# Patient Record
Sex: MALE | Race: WHITE | NOT HISPANIC OR LATINO | URBAN - METROPOLITAN AREA
[De-identification: names, ages, dates, MRNs, and addresses within clinical notes are randomized per-mention and may not be internally consistent; named-entity substitution may affect disease eponyms.]

---

## 2018-05-08 ENCOUNTER — APPOINTMENT (RX ONLY)
Dept: URBAN - METROPOLITAN AREA CLINIC 52 | Facility: CLINIC | Age: 83
Setting detail: DERMATOLOGY
End: 2018-05-08

## 2018-05-08 ENCOUNTER — APPOINTMENT (RX ONLY)
Dept: URBAN - METROPOLITAN AREA CLINIC 51 | Facility: CLINIC | Age: 83
Setting detail: DERMATOLOGY
End: 2018-05-08

## 2018-05-08 DIAGNOSIS — L57.0 ACTINIC KERATOSIS: ICD-10-CM

## 2018-05-08 DIAGNOSIS — L57.8 OTHER SKIN CHANGES DUE TO CHRONIC EXPOSURE TO NONIONIZING RADIATION: ICD-10-CM

## 2018-05-08 PROBLEM — M12.9 ARTHROPATHY, UNSPECIFIED: Status: ACTIVE | Noted: 2018-05-08

## 2018-05-08 PROBLEM — J44.9 CHRONIC OBSTRUCTIVE PULMONARY DISEASE, UNSPECIFIED: Status: ACTIVE | Noted: 2018-05-08

## 2018-05-08 PROBLEM — Z85.46 PERSONAL HISTORY OF MALIGNANT NEOPLASM OF PROSTATE: Status: ACTIVE | Noted: 2018-05-08

## 2018-05-08 PROBLEM — H91.90 UNSPECIFIED HEARING LOSS, UNSPECIFIED EAR: Status: ACTIVE | Noted: 2018-05-08

## 2018-05-08 PROBLEM — J45.909 UNSPECIFIED ASTHMA, UNCOMPLICATED: Status: ACTIVE | Noted: 2018-05-08

## 2018-05-08 PROCEDURE — 17000 DESTRUCT PREMALG LESION: CPT

## 2018-05-08 PROCEDURE — 17003 DESTRUCT PREMALG LES 2-14: CPT

## 2018-05-08 PROCEDURE — 99213 OFFICE O/P EST LOW 20 MIN: CPT | Mod: 25

## 2018-05-08 PROCEDURE — ? COUNSELING

## 2018-05-08 PROCEDURE — ? LIQUID NITROGEN

## 2018-05-08 ASSESSMENT — LOCATION DETAILED DESCRIPTION DERM
LOCATION DETAILED: RIGHT SUPERIOR UPPER BACK
LOCATION DETAILED: LEFT MID PREAURICULAR CHEEK
LOCATION DETAILED: RIGHT SUPERIOR MEDIAL UPPER BACK
LOCATION DETAILED: RIGHT SUPERIOR MEDIAL UPPER BACK
LOCATION DETAILED: STERNUM
LOCATION DETAILED: LEFT MID PREAURICULAR CHEEK
LOCATION DETAILED: LEFT SUPERIOR CENTRAL MALAR CHEEK
LOCATION DETAILED: RIGHT SUPERIOR UPPER BACK
LOCATION DETAILED: LEFT SUPERIOR CENTRAL MALAR CHEEK
LOCATION DETAILED: STERNUM

## 2018-05-08 ASSESSMENT — LOCATION SIMPLE DESCRIPTION DERM
LOCATION SIMPLE: LEFT CHEEK
LOCATION SIMPLE: RIGHT UPPER BACK
LOCATION SIMPLE: LEFT CHEEK
LOCATION SIMPLE: CHEST
LOCATION SIMPLE: CHEST
LOCATION SIMPLE: RIGHT UPPER BACK

## 2018-05-08 ASSESSMENT — LOCATION ZONE DERM
LOCATION ZONE: TRUNK
LOCATION ZONE: TRUNK
LOCATION ZONE: FACE
LOCATION ZONE: FACE

## 2018-05-08 NOTE — PROCEDURE: LIQUID NITROGEN
Post-Care Instructions: I reviewed with the patient in detail post-care instructions. Patient is to wear sunprotection, and avoid picking at any of the treated lesions. Pt may apply Vaseline to crusted or scabbing areas.
Duration Of Freeze Thaw-Cycle (Seconds): 0
Render Post-Care Instructions In Note?: no
Number Of Freeze-Thaw Cycles: 3 freeze-thaw cycles
Consent: The patient's consent was obtained including but not limited to risks of crusting, scabbing, blistering, scarring, darker or lighter pigmentary change, recurrence, incomplete removal and infection.
Detail Level: Detailed

## 2018-05-08 NOTE — PROCEDURE: LIQUID NITROGEN
Duration Of Freeze Thaw-Cycle (Seconds): 0
Number Of Freeze-Thaw Cycles: 3 freeze-thaw cycles
Detail Level: Detailed
Render Post-Care Instructions In Note?: no
Consent: The patient's consent was obtained including but not limited to risks of crusting, scabbing, blistering, scarring, darker or lighter pigmentary change, recurrence, incomplete removal and infection.
Post-Care Instructions: I reviewed with the patient in detail post-care instructions. Patient is to wear sunprotection, and avoid picking at any of the treated lesions. Pt may apply Vaseline to crusted or scabbing areas.

## 2020-07-01 ENCOUNTER — TELEPHONE ENCOUNTER (OUTPATIENT)
Dept: URBAN - METROPOLITAN AREA CLINIC 118 | Facility: CLINIC | Age: 85
End: 2020-07-01

## 2020-07-02 ENCOUNTER — TELEPHONE ENCOUNTER (OUTPATIENT)
Dept: URBAN - METROPOLITAN AREA CLINIC 92 | Facility: CLINIC | Age: 85
End: 2020-07-02

## 2020-08-12 ENCOUNTER — OFFICE VISIT (OUTPATIENT)
Dept: URBAN - METROPOLITAN AREA CLINIC 118 | Facility: CLINIC | Age: 85
End: 2020-08-12

## 2020-08-12 RX ORDER — ASPIRIN 81 MG/1
TABLET, COATED ORAL
Qty: 0 | Refills: 0 | Status: ACTIVE | COMMUNITY
Start: 1900-01-01 | End: 1900-01-01

## 2020-08-12 RX ORDER — TEMAZEPAM 15 MG/1
CAPSULE ORAL
Qty: 0 | Refills: 0 | Status: ACTIVE | COMMUNITY
Start: 1900-01-01 | End: 1900-01-01

## 2020-08-12 RX ORDER — METOPROLOL TARTRATE 50 MG/1
TABLET, FILM COATED ORAL
Qty: 0 | Refills: 0 | Status: ACTIVE | COMMUNITY
Start: 1900-01-01 | End: 1900-01-01

## 2020-08-12 RX ORDER — DEXTROSE 4 G
TAKE 1 TABLET (10 MG) BY ORAL ROUTE ONCE DAILY TABLET,CHEWABLE ORAL 1
Qty: 0 | Refills: 0 | Status: ACTIVE | COMMUNITY
Start: 1900-01-01 | End: 1900-01-01

## 2020-08-12 RX ORDER — LEVOTHYROXINE SODIUM 25 UG/1
TABLET ORAL
Qty: 0 | Refills: 0 | Status: ACTIVE | COMMUNITY
Start: 1900-01-01 | End: 1900-01-01

## 2020-08-12 RX ORDER — SIMVASTATIN 20 MG/1
TABLET, FILM COATED ORAL
Qty: 0 | Refills: 0 | Status: ACTIVE | COMMUNITY
Start: 1900-01-01 | End: 1900-01-01

## 2020-08-12 RX ORDER — FINASTERIDE 5 MG/1
TABLET, FILM COATED ORAL
Qty: 0 | Refills: 0 | Status: ACTIVE | COMMUNITY
Start: 1900-01-01 | End: 1900-01-01

## 2020-08-12 RX ORDER — PANTOPRAZOLE SODIUM 40 MG/1
TAKE 1 TABLET (40 MG) BY ORAL ROUTE ONCE DAILY TABLET, DELAYED RELEASE ORAL 1
Qty: 90 | Refills: 3 | Status: ACTIVE | COMMUNITY
Start: 2020-05-20 | End: 1900-01-01

## 2020-08-12 RX ORDER — HYDROCODONE BITARTRATE AND ACETAMINOPHEN 5; 325 MG/1; MG/1
TABLET ORAL
Qty: 0 | Refills: 0 | Status: ACTIVE | COMMUNITY
Start: 1900-01-01 | End: 1900-01-01

## 2020-08-12 RX ORDER — EZETIMIBE 10 MG/1
TAKE 1 TABLET (10 MG) BY ORAL ROUTE ONCE DAILY TABLET ORAL 1
Qty: 0 | Refills: 0 | Status: ACTIVE | COMMUNITY
Start: 1900-01-01 | End: 1900-01-01

## 2020-08-12 RX ORDER — FAMOTIDINE 40 MG/1
TAKE 1 TABLET (40 MG) BY ORAL ROUTE ONCE DAILY AT BEDTIME TABLET, FILM COATED ORAL 1
Qty: 90 | Refills: 0 | Status: ACTIVE | COMMUNITY
Start: 2020-05-20 | End: 1900-01-01

## 2020-10-20 ENCOUNTER — OFFICE VISIT (OUTPATIENT)
Dept: URBAN - METROPOLITAN AREA CLINIC 118 | Facility: CLINIC | Age: 85
End: 2020-10-20
Payer: MEDICARE

## 2020-10-20 DIAGNOSIS — K59.04 CHRONIC IDIOPATHIC CONSTIPATION: ICD-10-CM

## 2020-10-20 DIAGNOSIS — K21.00 GASTROESOPHAGEAL REFLUX DISEASE WITH ESOPHAGITIS WITHOUT HEMORRHAGE: ICD-10-CM

## 2020-10-20 PROCEDURE — 1036F TOBACCO NON-USER: CPT | Performed by: INTERNAL MEDICINE

## 2020-10-20 PROCEDURE — G8427 DOCREV CUR MEDS BY ELIG CLIN: HCPCS | Performed by: INTERNAL MEDICINE

## 2020-10-20 PROCEDURE — 99214 OFFICE O/P EST MOD 30 MIN: CPT | Performed by: INTERNAL MEDICINE

## 2020-10-20 PROCEDURE — G8420 CALC BMI NORM PARAMETERS: HCPCS | Performed by: INTERNAL MEDICINE

## 2020-10-20 PROCEDURE — G8482 FLU IMMUNIZE ORDER/ADMIN: HCPCS | Performed by: INTERNAL MEDICINE

## 2020-10-20 RX ORDER — SUCRALFATE 1 G/1
1 TABLET AT NIGHT TABLET ORAL
Qty: 30 TABLET | Refills: 5 | OUTPATIENT
Start: 2020-10-20 | End: 2021-04-18

## 2020-10-20 RX ORDER — FINASTERIDE 5 MG/1
TABLET, FILM COATED ORAL
Qty: 0 | Refills: 0 | Status: ACTIVE | COMMUNITY
Start: 1900-01-01

## 2020-10-20 RX ORDER — TRAMADOL HYDROCHLORIDE 50 MG/1
1 TABLET AS NEEDED TABLET, FILM COATED ORAL ONCE A DAY
Status: ACTIVE | COMMUNITY

## 2020-10-20 RX ORDER — ASPIRIN 81 MG/1
TABLET, COATED ORAL
Qty: 0 | Refills: 0 | Status: ACTIVE | COMMUNITY
Start: 1900-01-01

## 2020-10-20 RX ORDER — METOPROLOL TARTRATE 50 MG/1
TABLET, FILM COATED ORAL
Qty: 0 | Refills: 0 | Status: ACTIVE | COMMUNITY
Start: 1900-01-01

## 2020-10-20 RX ORDER — FAMOTIDINE 40 MG/1
TAKE 1 TABLET (40 MG) BY ORAL ROUTE ONCE DAILY AT BEDTIME TABLET, FILM COATED ORAL 1
Qty: 90 | Refills: 0 | Status: ACTIVE | COMMUNITY
Start: 2020-05-20

## 2020-10-20 RX ORDER — MONTELUKAST SODIUM 10 MG/1
1 TABLET TABLET ORAL ONCE A DAY
Status: ACTIVE | COMMUNITY

## 2020-10-20 RX ORDER — PANTOPRAZOLE SODIUM 40 MG/1
TAKE 1 TABLET (40 MG) BY ORAL ROUTE ONCE DAILY TABLET, DELAYED RELEASE ORAL 1
Qty: 90 | Refills: 3 | Status: ACTIVE | COMMUNITY
Start: 2020-05-20

## 2020-10-20 RX ORDER — DEXTROSE 4 G
TAKE 1 TABLET (10 MG) BY ORAL ROUTE ONCE DAILY TABLET,CHEWABLE ORAL 1
Qty: 0 | Refills: 0 | Status: ACTIVE | COMMUNITY
Start: 1900-01-01

## 2020-10-20 RX ORDER — LEVOTHYROXINE SODIUM 25 UG/1
TABLET ORAL
Qty: 0 | Refills: 0 | Status: ACTIVE | COMMUNITY
Start: 1900-01-01

## 2020-10-20 RX ORDER — EZETIMIBE 10 MG/1
TAKE 1 TABLET (10 MG) BY ORAL ROUTE ONCE DAILY TABLET ORAL 1
Qty: 0 | Refills: 0 | Status: ACTIVE | COMMUNITY
Start: 1900-01-01

## 2020-10-20 RX ORDER — HYDROCODONE BITARTRATE AND ACETAMINOPHEN 5; 325 MG/1; MG/1
TABLET ORAL
Qty: 0 | Refills: 0 | Status: ACTIVE | COMMUNITY
Start: 1900-01-01

## 2020-10-20 RX ORDER — SIMVASTATIN 20 MG/1
TABLET, FILM COATED ORAL
Qty: 0 | Refills: 0 | Status: ACTIVE | COMMUNITY
Start: 1900-01-01

## 2020-10-20 RX ORDER — TEMAZEPAM 15 MG/1
CAPSULE ORAL
Qty: 0 | Refills: 0 | Status: ACTIVE | COMMUNITY
Start: 1900-01-01

## 2020-10-20 NOTE — HPI-TODAY'S VISIT:
The patient is were referred by Steven Muse for both dyspepsia as well as chronic constipation.  He has a long history of intermittent, nonulcer dyspepsia and has followed with Dr. Woods in the past.  An upper endoscopy in 2016 showed gastritis but no was otherwise unremarkable the patient has no history of ulcer disease.  His acid reflux/dyspepsia appears to have more worsened markedly in the past 2 years, since he was diagnosed with chronic cholecystitis.  He had extensive work-up for that, including insertion of a cholecystostomy tube at [facility].  His tubes are now out and no surgery is planned given his medical comorbid conditions.  However he continues to complain of severe burning and regurgitation particularly at night; there is no dysphagia, weight loss, odynophagia, or melena.  He is currently taking Protonix twice daily therapy and will take Tums as needed at night he has a symptom of regurgitation he has had no gastric or esophageal surgery in addition he does have chronic constipation a colonoscopy in 2016 was within normal limits by his report he takes Metamucil every day but he feels bloated from this he has no gross blood in his stools

## 2020-12-08 ENCOUNTER — OFFICE VISIT (OUTPATIENT)
Dept: URBAN - METROPOLITAN AREA CLINIC 118 | Facility: CLINIC | Age: 85
End: 2020-12-08
Payer: MEDICARE

## 2020-12-08 ENCOUNTER — LAB OUTSIDE AN ENCOUNTER (OUTPATIENT)
Dept: URBAN - METROPOLITAN AREA CLINIC 118 | Facility: CLINIC | Age: 85
End: 2020-12-08

## 2020-12-08 DIAGNOSIS — R10.13 EPIGASTRIC ABDOMINAL PAIN: ICD-10-CM

## 2020-12-08 DIAGNOSIS — K21.00 GASTROESOPHAGEAL REFLUX DISEASE WITH ESOPHAGITIS WITHOUT HEMORRHAGE: ICD-10-CM

## 2020-12-08 PROCEDURE — 1036F TOBACCO NON-USER: CPT | Performed by: INTERNAL MEDICINE

## 2020-12-08 PROCEDURE — G8420 CALC BMI NORM PARAMETERS: HCPCS | Performed by: INTERNAL MEDICINE

## 2020-12-08 PROCEDURE — G8427 DOCREV CUR MEDS BY ELIG CLIN: HCPCS | Performed by: INTERNAL MEDICINE

## 2020-12-08 PROCEDURE — 99214 OFFICE O/P EST MOD 30 MIN: CPT | Performed by: INTERNAL MEDICINE

## 2020-12-08 PROCEDURE — G8482 FLU IMMUNIZE ORDER/ADMIN: HCPCS | Performed by: INTERNAL MEDICINE

## 2020-12-08 RX ORDER — DEXTROSE 4 G
TAKE 1 TABLET (10 MG) BY ORAL ROUTE ONCE DAILY TABLET,CHEWABLE ORAL 1
Qty: 0 | Refills: 0 | Status: ACTIVE | COMMUNITY
Start: 1900-01-01

## 2020-12-08 RX ORDER — TRAMADOL HYDROCHLORIDE 50 MG/1
1 TABLET AS NEEDED TABLET, FILM COATED ORAL ONCE A DAY
Status: ACTIVE | COMMUNITY

## 2020-12-08 RX ORDER — SIMVASTATIN 20 MG/1
TABLET, FILM COATED ORAL
Qty: 0 | Refills: 0 | Status: ACTIVE | COMMUNITY
Start: 1900-01-01

## 2020-12-08 RX ORDER — ASPIRIN 81 MG/1
TABLET, COATED ORAL
Qty: 0 | Refills: 0 | Status: ACTIVE | COMMUNITY
Start: 1900-01-01

## 2020-12-08 RX ORDER — EZETIMIBE 10 MG/1
TAKE 1 TABLET (10 MG) BY ORAL ROUTE ONCE DAILY TABLET ORAL 1
Qty: 0 | Refills: 0 | Status: ACTIVE | COMMUNITY
Start: 1900-01-01

## 2020-12-08 RX ORDER — PANTOPRAZOLE SODIUM 40 MG/1
TAKE 1 TABLET (40 MG) BY ORAL ROUTE ONCE DAILY TABLET, DELAYED RELEASE ORAL 1
Qty: 90 | Refills: 3 | Status: ACTIVE | COMMUNITY
Start: 2020-05-20

## 2020-12-08 RX ORDER — FAMOTIDINE 40 MG/1
TAKE 1 TABLET (40 MG) BY ORAL ROUTE ONCE DAILY AT BEDTIME TABLET, FILM COATED ORAL 1
Qty: 90 | Refills: 0 | Status: ACTIVE | COMMUNITY
Start: 2020-05-20

## 2020-12-08 RX ORDER — SUCRALFATE 1 G/1
1 TABLET AT NIGHT TABLET ORAL TWICE A DAY
Qty: 60 TABLET | Refills: 5
Start: 2020-10-20 | End: 2021-04-18

## 2020-12-08 RX ORDER — FINASTERIDE 5 MG/1
TABLET, FILM COATED ORAL
Qty: 0 | Refills: 0 | Status: ACTIVE | COMMUNITY
Start: 1900-01-01

## 2020-12-08 RX ORDER — SUCRALFATE 1 G/1
1 TABLET AT NIGHT TABLET ORAL
Qty: 30 TABLET | Refills: 5 | Status: ACTIVE | COMMUNITY
Start: 2020-10-20 | End: 2021-04-18

## 2020-12-08 RX ORDER — METOPROLOL TARTRATE 50 MG/1
TABLET, FILM COATED ORAL
Qty: 0 | Refills: 0 | Status: ACTIVE | COMMUNITY
Start: 1900-01-01

## 2020-12-08 RX ORDER — TEMAZEPAM 15 MG/1
CAPSULE ORAL
Qty: 0 | Refills: 0 | Status: ACTIVE | COMMUNITY
Start: 1900-01-01

## 2020-12-08 RX ORDER — HYDROCODONE BITARTRATE AND ACETAMINOPHEN 5; 325 MG/1; MG/1
TABLET ORAL
Qty: 0 | Refills: 0 | Status: ACTIVE | COMMUNITY
Start: 1900-01-01

## 2020-12-08 RX ORDER — LEVOTHYROXINE SODIUM 25 UG/1
TABLET ORAL
Qty: 0 | Refills: 0 | Status: ACTIVE | COMMUNITY
Start: 1900-01-01

## 2020-12-08 RX ORDER — MONTELUKAST SODIUM 10 MG/1
1 TABLET TABLET ORAL ONCE A DAY
Status: ACTIVE | COMMUNITY

## 2020-12-08 NOTE — HPI-TODAY'S VISIT:
The patient is following up after his visit in October.  He continues on Protonix once or twice daily, it is unclear based on his report, as well as Carafate every night.  He has not lost any weight and he denies melena or hematemesis.  He continues to have burning epigastric pain but no loss of appetite or nausea/vomiting.  His vital signs are stable as is his weight over the last 2 months.  Of note he request both tramadol and Restoril during today's visit to assist with the pain, and we discussed at length why this would be inappropriate.  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  He has a bowel movement every 2 to 3 days, and complains of gas and bloating without a bowel movement.

## 2021-01-12 ENCOUNTER — TELEPHONE ENCOUNTER (OUTPATIENT)
Dept: URBAN - METROPOLITAN AREA CLINIC 92 | Facility: CLINIC | Age: 86
End: 2021-01-12

## 2021-01-13 ENCOUNTER — TELEPHONE ENCOUNTER (OUTPATIENT)
Dept: URBAN - METROPOLITAN AREA CLINIC 118 | Facility: CLINIC | Age: 86
End: 2021-01-13

## 2021-01-20 ENCOUNTER — OFFICE VISIT (OUTPATIENT)
Dept: URBAN - METROPOLITAN AREA CLINIC 118 | Facility: CLINIC | Age: 86
End: 2021-01-20
Payer: MEDICARE

## 2021-01-20 ENCOUNTER — LAB OUTSIDE AN ENCOUNTER (OUTPATIENT)
Dept: URBAN - METROPOLITAN AREA CLINIC 118 | Facility: CLINIC | Age: 86
End: 2021-01-20

## 2021-01-20 DIAGNOSIS — K80.20 GALLSTONES: ICD-10-CM

## 2021-01-20 DIAGNOSIS — R10.11 RUQ ABDOMINAL PAIN: ICD-10-CM

## 2021-01-20 DIAGNOSIS — K59.04 CHRONIC IDIOPATHIC CONSTIPATION: ICD-10-CM

## 2021-01-20 PROCEDURE — G8482 FLU IMMUNIZE ORDER/ADMIN: HCPCS | Performed by: INTERNAL MEDICINE

## 2021-01-20 PROCEDURE — 1036F TOBACCO NON-USER: CPT | Performed by: INTERNAL MEDICINE

## 2021-01-20 PROCEDURE — 99214 OFFICE O/P EST MOD 30 MIN: CPT | Performed by: INTERNAL MEDICINE

## 2021-01-20 PROCEDURE — G8427 DOCREV CUR MEDS BY ELIG CLIN: HCPCS | Performed by: INTERNAL MEDICINE

## 2021-01-20 PROCEDURE — G8420 CALC BMI NORM PARAMETERS: HCPCS | Performed by: INTERNAL MEDICINE

## 2021-01-20 RX ORDER — SUCRALFATE 1 G/1
1 TABLET AT NIGHT TABLET ORAL TWICE A DAY
Qty: 60 TABLET | Refills: 5 | Status: ACTIVE | COMMUNITY
Start: 2020-10-20 | End: 2021-04-18

## 2021-01-20 RX ORDER — FAMOTIDINE 40 MG/1
TAKE 1 TABLET (40 MG) BY ORAL ROUTE ONCE DAILY AT BEDTIME TABLET, FILM COATED ORAL 1
Qty: 90 | Refills: 0 | Status: ACTIVE | COMMUNITY
Start: 2020-05-20

## 2021-01-20 RX ORDER — FINASTERIDE 5 MG/1
TABLET, FILM COATED ORAL
Qty: 0 | Refills: 0 | Status: ACTIVE | COMMUNITY
Start: 1900-01-01

## 2021-01-20 RX ORDER — TRAMADOL HYDROCHLORIDE 50 MG/1
1 TABLET AS NEEDED TABLET, FILM COATED ORAL ONCE A DAY
Status: ACTIVE | COMMUNITY

## 2021-01-20 RX ORDER — MONTELUKAST SODIUM 10 MG/1
1 TABLET TABLET ORAL ONCE A DAY
Status: ACTIVE | COMMUNITY

## 2021-01-20 RX ORDER — TEMAZEPAM 15 MG/1
CAPSULE ORAL
Qty: 0 | Refills: 0 | Status: ACTIVE | COMMUNITY
Start: 1900-01-01

## 2021-01-20 RX ORDER — PANTOPRAZOLE SODIUM 40 MG/1
TAKE 1 TABLET (40 MG) BY ORAL ROUTE ONCE DAILY TABLET, DELAYED RELEASE ORAL 1
Qty: 90 | Refills: 3 | Status: ACTIVE | COMMUNITY
Start: 2020-05-20

## 2021-01-20 RX ORDER — LEVOTHYROXINE SODIUM 25 UG/1
TABLET ORAL
Qty: 0 | Refills: 0 | Status: ACTIVE | COMMUNITY
Start: 1900-01-01

## 2021-01-20 RX ORDER — ASPIRIN 81 MG/1
TABLET, COATED ORAL
Qty: 0 | Refills: 0 | Status: ACTIVE | COMMUNITY
Start: 1900-01-01

## 2021-01-20 RX ORDER — EZETIMIBE 10 MG/1
TAKE 1 TABLET (10 MG) BY ORAL ROUTE ONCE DAILY TABLET ORAL 1
Qty: 0 | Refills: 0 | Status: ACTIVE | COMMUNITY
Start: 1900-01-01

## 2021-01-20 RX ORDER — METOPROLOL TARTRATE 50 MG/1
TABLET, FILM COATED ORAL
Qty: 0 | Refills: 0 | Status: ACTIVE | COMMUNITY
Start: 1900-01-01

## 2021-01-20 RX ORDER — DEXTROSE 4 G
TAKE 1 TABLET (10 MG) BY ORAL ROUTE ONCE DAILY TABLET,CHEWABLE ORAL 1
Qty: 0 | Refills: 0 | Status: ACTIVE | COMMUNITY
Start: 1900-01-01

## 2021-01-20 RX ORDER — SIMVASTATIN 20 MG/1
TABLET, FILM COATED ORAL
Qty: 0 | Refills: 0 | Status: ACTIVE | COMMUNITY
Start: 1900-01-01

## 2021-01-20 RX ORDER — HYDROCODONE BITARTRATE AND ACETAMINOPHEN 5; 325 MG/1; MG/1
TABLET ORAL
Qty: 0 | Refills: 0 | Status: ACTIVE | COMMUNITY
Start: 1900-01-01

## 2021-01-20 NOTE — HPI-TODAY'S VISIT:
The patient is following up after his recent upper GI series, which confirms reflux with a trace of aspiration, but no mucosal abnormalities.  He has converted his Protonix from twice a day to once a day, and is using his Carafate 1-2 times per day.  He has no hematemesis, nausea or vomiting, or abnormal loss of weight.  There is no melena.  He is using Senokot and Colace, but only as needed, for his chronic idiopathic constipation; this leads to a bowel movement about every 3 days without blood, but previously he was every 4 to 5 days without laxative therapy.  He does have some mild right upper quadrant pain and fullness that radiates to the epigastric region, but is not associated with postprandial nausea and vomiting. The patient is following up after his recent upper GI series, which confirms reflux with a trace of aspiration, but no mucosal abnormalities.  He has converted his Protonix from twice a day to once a day, and is using his Carafate 1-2 times per day.  He has no hematemesis, nausea or vomiting, or abnormal loss of weight.  There is no melena.  He is using Senokot and Colace, but only as needed, for his chronic idiopathic constipation; this leads to a bowel movement about every 3 days without blood, but previously he was every 4 to 5 days without laxative therapy.  He does have some mild right upper quadrant pain and fullness that radiates to the epigastric region, but is not associated with postprandial nausea and vomiting.

## 2021-01-21 PROBLEM — 235919008: Status: ACTIVE | Noted: 2021-01-20

## 2021-02-01 ENCOUNTER — OFFICE VISIT (OUTPATIENT)
Dept: URBAN - METROPOLITAN AREA CLINIC 117 | Facility: CLINIC | Age: 86
End: 2021-02-01
Payer: MEDICARE

## 2021-02-01 DIAGNOSIS — R10.11 RUQ PAIN: ICD-10-CM

## 2021-02-01 PROCEDURE — 76705 ECHO EXAM OF ABDOMEN: CPT | Performed by: INTERNAL MEDICINE

## 2021-02-01 RX ORDER — SUCRALFATE 1 G/1
1 TABLET AT NIGHT TABLET ORAL TWICE A DAY
Qty: 60 TABLET | Refills: 5 | Status: ACTIVE | COMMUNITY
Start: 2020-10-20 | End: 2021-04-18

## 2021-02-01 RX ORDER — MONTELUKAST SODIUM 10 MG/1
1 TABLET TABLET ORAL ONCE A DAY
Status: ACTIVE | COMMUNITY

## 2021-02-01 RX ORDER — SIMVASTATIN 20 MG/1
TABLET, FILM COATED ORAL
Qty: 0 | Refills: 0 | Status: ACTIVE | COMMUNITY
Start: 1900-01-01

## 2021-02-01 RX ORDER — FINASTERIDE 5 MG/1
TABLET, FILM COATED ORAL
Qty: 0 | Refills: 0 | Status: ACTIVE | COMMUNITY
Start: 1900-01-01

## 2021-02-01 RX ORDER — METOPROLOL TARTRATE 50 MG/1
TABLET, FILM COATED ORAL
Qty: 0 | Refills: 0 | Status: ACTIVE | COMMUNITY
Start: 1900-01-01

## 2021-02-01 RX ORDER — ASPIRIN 81 MG/1
TABLET, COATED ORAL
Qty: 0 | Refills: 0 | Status: ACTIVE | COMMUNITY
Start: 1900-01-01

## 2021-02-01 RX ORDER — HYDROCODONE BITARTRATE AND ACETAMINOPHEN 5; 325 MG/1; MG/1
TABLET ORAL
Qty: 0 | Refills: 0 | Status: ACTIVE | COMMUNITY
Start: 1900-01-01

## 2021-02-01 RX ORDER — FAMOTIDINE 40 MG/1
TAKE 1 TABLET (40 MG) BY ORAL ROUTE ONCE DAILY AT BEDTIME TABLET, FILM COATED ORAL 1
Qty: 90 | Refills: 0 | Status: ACTIVE | COMMUNITY
Start: 2020-05-20

## 2021-02-01 RX ORDER — TEMAZEPAM 15 MG/1
CAPSULE ORAL
Qty: 0 | Refills: 0 | Status: ACTIVE | COMMUNITY
Start: 1900-01-01

## 2021-02-01 RX ORDER — PANTOPRAZOLE SODIUM 40 MG/1
TAKE 1 TABLET (40 MG) BY ORAL ROUTE ONCE DAILY TABLET, DELAYED RELEASE ORAL 1
Qty: 90 | Refills: 3 | Status: ACTIVE | COMMUNITY
Start: 2020-05-20

## 2021-02-01 RX ORDER — LEVOTHYROXINE SODIUM 25 UG/1
TABLET ORAL
Qty: 0 | Refills: 0 | Status: ACTIVE | COMMUNITY
Start: 1900-01-01

## 2021-02-01 RX ORDER — TRAMADOL HYDROCHLORIDE 50 MG/1
1 TABLET AS NEEDED TABLET, FILM COATED ORAL ONCE A DAY
Status: ACTIVE | COMMUNITY

## 2021-02-01 RX ORDER — EZETIMIBE 10 MG/1
TAKE 1 TABLET (10 MG) BY ORAL ROUTE ONCE DAILY TABLET ORAL 1
Qty: 0 | Refills: 0 | Status: ACTIVE | COMMUNITY
Start: 1900-01-01

## 2021-02-01 RX ORDER — DEXTROSE 4 G
TAKE 1 TABLET (10 MG) BY ORAL ROUTE ONCE DAILY TABLET,CHEWABLE ORAL 1
Qty: 0 | Refills: 0 | Status: ACTIVE | COMMUNITY
Start: 1900-01-01

## 2021-02-25 ENCOUNTER — OFFICE VISIT (OUTPATIENT)
Dept: URBAN - METROPOLITAN AREA CLINIC 118 | Facility: CLINIC | Age: 86
End: 2021-02-25
Payer: MEDICARE

## 2021-02-25 DIAGNOSIS — K21.00 GASTROESOPHAGEAL REFLUX DISEASE WITH ESOPHAGITIS WITHOUT HEMORRHAGE: ICD-10-CM

## 2021-02-25 DIAGNOSIS — K59.04 CHRONIC IDIOPATHIC CONSTIPATION: ICD-10-CM

## 2021-02-25 DIAGNOSIS — K82.9 GALLBLADDER DISEASE: ICD-10-CM

## 2021-02-25 DIAGNOSIS — R11.0 CHRONIC NAUSEA: ICD-10-CM

## 2021-02-25 PROCEDURE — 99214 OFFICE O/P EST MOD 30 MIN: CPT | Performed by: INTERNAL MEDICINE

## 2021-02-25 RX ORDER — ASPIRIN 81 MG/1
TABLET, COATED ORAL
Qty: 0 | Refills: 0 | Status: ACTIVE | COMMUNITY
Start: 1900-01-01

## 2021-02-25 RX ORDER — EZETIMIBE 10 MG/1
TAKE 1 TABLET (10 MG) BY ORAL ROUTE ONCE DAILY TABLET ORAL 1
Qty: 0 | Refills: 0 | Status: ACTIVE | COMMUNITY
Start: 1900-01-01

## 2021-02-25 RX ORDER — MONTELUKAST SODIUM 10 MG/1
1 TABLET TABLET ORAL ONCE A DAY
Status: ACTIVE | COMMUNITY

## 2021-02-25 RX ORDER — TEMAZEPAM 15 MG/1
CAPSULE ORAL
Qty: 0 | Refills: 0 | Status: ACTIVE | COMMUNITY
Start: 1900-01-01

## 2021-02-25 RX ORDER — PANTOPRAZOLE SODIUM 40 MG/1
TAKE 1 TABLET (40 MG) BY ORAL ROUTE ONCE DAILY TABLET, DELAYED RELEASE ORAL 1
Qty: 90 | Refills: 3 | Status: ACTIVE | COMMUNITY
Start: 2020-05-20

## 2021-02-25 RX ORDER — HYDROCODONE BITARTRATE AND ACETAMINOPHEN 5; 325 MG/1; MG/1
TABLET ORAL
Qty: 0 | Refills: 0 | Status: ACTIVE | COMMUNITY
Start: 1900-01-01

## 2021-02-25 RX ORDER — DEXTROSE 4 G
TAKE 1 TABLET (10 MG) BY ORAL ROUTE ONCE DAILY TABLET,CHEWABLE ORAL 1
Qty: 0 | Refills: 0 | Status: ACTIVE | COMMUNITY
Start: 1900-01-01

## 2021-02-25 RX ORDER — SUCRALFATE 1 G/1
1 TABLET AT NIGHT TABLET ORAL TWICE A DAY
Qty: 60 TABLET | Refills: 5 | Status: ACTIVE | COMMUNITY
Start: 2020-10-20 | End: 2021-04-18

## 2021-02-25 RX ORDER — FINASTERIDE 5 MG/1
TABLET, FILM COATED ORAL
Qty: 0 | Refills: 0 | Status: ACTIVE | COMMUNITY
Start: 1900-01-01

## 2021-02-25 RX ORDER — METOPROLOL TARTRATE 50 MG/1
TABLET, FILM COATED ORAL
Qty: 0 | Refills: 0 | Status: ACTIVE | COMMUNITY
Start: 1900-01-01

## 2021-02-25 RX ORDER — FAMOTIDINE 40 MG/1
TAKE 1 TABLET (40 MG) BY ORAL ROUTE ONCE DAILY AT BEDTIME TABLET, FILM COATED ORAL 1
Qty: 90 | Refills: 0 | Status: ACTIVE | COMMUNITY
Start: 2020-05-20

## 2021-02-25 RX ORDER — TRAMADOL HYDROCHLORIDE 50 MG/1
1 TABLET AS NEEDED TABLET, FILM COATED ORAL ONCE A DAY
Status: ACTIVE | COMMUNITY

## 2021-02-25 RX ORDER — SIMVASTATIN 20 MG/1
TABLET, FILM COATED ORAL
Qty: 0 | Refills: 0 | Status: ACTIVE | COMMUNITY
Start: 1900-01-01

## 2021-02-25 RX ORDER — LEVOTHYROXINE SODIUM 25 UG/1
TABLET ORAL
Qty: 0 | Refills: 0 | Status: ACTIVE | COMMUNITY
Start: 1900-01-01

## 2021-02-25 NOTE — PHYSICAL EXAM GASTROINTESTINAL
Patient Education     Constipation (Child)    Bowel movement patterns vary in children. A child around age 2 will have about 2 bowel movements per day. After 4 years of age, a child may have 1 bowel movement per day.  A normal stool is soft and easy to pass. But sometimes stools become firm or hard. They are difficult to pass. They may pass less often. This is called constipation. It is common in children. Each child's bowel habits are a little different. What seems like constipation in one child may be normal in another. Symptoms of constipation can include:  · Abdominal pain  · Refusal to eat  · Bloating  · Vomiting  · Problems holding in urine or stool  · Stool in your child's underwear  · Painful bowel movements  · Itching, swelling, or pain around the anus  · Any behavior that looks like the child is trying to hold stool in, such as standing on toes, holding in abdominal muscles, or \"dance like\" behaviors  Sometimes streaks of blood can occur in the stool, usually due to an anal fissure. This is a tearing of the anal lining caused by straining with constipation. However, any blood in the stool needs to be evaluated by your child's doctor.  Constipation can have many causes, such as:  · Eating a diet low in fiber  · Not drinking enough liquids  · Lack of exercise or physical activity  · Stress or changes in routine  · Frequent use or misuse of laxatives  · Ignoring the urge to have a bowel movement or delaying bowel movements  · Medicines such as prescription pain medicine, iron, antacids, certain antidepressants, and calcium supplements  · Less commonly, bowel blockage and bowel inflammation  · Spinal disorders  · Thyroid problems  · Celiac disease  Simple constipation is easy to stop once the cause is known. Healthcare providers may not do any tests to diagnose constipation.  Home care  Your child’s healthcare provider may prescribe a bowel stimulant, lubricant, or suppository. Your child may also need an  Abdomen , soft, nontender, nondistended , no guarding or rigidity , no masses palpable , normal bowel sounds , Liver and Spleen , no hepatomegaly present , no hepatosplenomegaly , liver nontender , spleen not palpable enema or a laxative. Follow all instructions on how and when to use these products.  Food, drink, and habit changes  You can help treat and prevent your child’s constipation with some simple changes in diet and habits.  Make changes in your child’s diet, such as:  · Talk with your child's doctor about his or her milk intake. In children who don't respond to other conservative measures, your healthcare provider may advise stopping cow's milk for 2 weeks to see if symptoms improve. If symptoms improve during this trial, you may switch to a non-dairy form of milk. This is likely a form of milk allergy rather than true constipation.  · Increase fiber in your child’s diet. You can do this by adding fruits, vegetables, cereals, and grains.  · Make sure your child eats less meat and processed foods.  · Make sure your child drinks plenty of water. Certain fruit juices such as pear, prune, and apple can be helpful. However, fruit juices are full of sugar. The Academy of Pediatrics recommends no juice for children under 1 year of age. Children age 1 to 3 should have no more than 4 ounces of juice per day. Children 4 to 6 should have no more than 4 to 6 ounces of juice per day. Children 7 to 18 should have no more than 8 ounces of 1 cup of juice per day.  · Be patient and make diet changes over time. Most children can be fussy about food.  Help your child have good toilet habits. Make sure to:  · Teach your child not wait to have a bowel movement.  · Have your child sit on the toilet for 10 minutes at the same time each day. It is helpful to have your child sit after each meal. This helps to create a routine.  · Give your child a comfortable child’s toilet seat and a footstool.  · You can read or keep your child company to make it a positive experience.  Follow-up care  Follow up with your child’s healthcare provider.  Special note to parents  Learn to be familiar with your child’s normal bowel pattern. Note the color, form,  and frequency of stools.  When to seek medical advice  Call your child’s healthcare provider right away if any of these occur:  · Abdominal pain that gets worse  · Fussiness or crying that can’t be soothed  · Refusal to drink or eat  · Blood in stool  · Black, tarry stool  · Constipation that does not get better  · Weight loss  · Your child has a fever (see Children and fever, below)  Fever and children  Always use a digital thermometer to check your child’s temperature. Never use a mercury thermometer.  For infants and toddlers, be sure to use a rectal thermometer correctly. A rectal thermometer may accidentally poke a hole in (perforate) the rectum. It may also pass on germs from the stool. Always follow the product maker’s directions for proper use. If you don’t feel comfortable taking a rectal temperature, use another method. When you talk to your child’s healthcare provider, tell him or her which method you used to take your child’s temperature.  Here are guidelines for fever temperature. Ear temperatures aren’t accurate before 6 months of age. Don’t take an oral temperature until your child is at least 4 years old.  Infant under 3 months old:  · Ask your child’s healthcare provider how you should take the temperature.  · Rectal or forehead (temporal artery) temperature of 100.4°F (38°C) or higher, or as directed by the provider  · Armpit temperature of 99°F (37.2°C) or higher, or as directed by the provider  Child age 3 to 36 months:  · Rectal, forehead (temporal artery), or ear temperature of 102°F (38.9°C) or higher, or as directed by the provider  · Armpit temperature of 101°F (38.3°C) or higher, or as directed by the provider  Child of any age:  · Repeated temperature of 104°F (40°C) or higher, or as directed by the provider  · Fever that lasts more than 24 hours in a child under 2 years old. Or a fever that lasts for 3 days in a child 2 years or older.   Date Last Reviewed: 3/1/2018  © 0835-3857 [employer]. [address] [place] [address], [place] [address]. All rights reserved. This information is not intended as a substitute for professional medical care. Always follow your healthcare professional's instructions.           Patient Education     Vomiting (Infant)  Vomiting is common in infants. There are many possible causes, including viral infection and reflux (GERD). Many common illnesses such as colds and ear infections can also cause vomiting.  Vomiting in young children can usually be treated at home. The healthcare provider usually won’t prescribe medicines to prevent vomiting unless symptoms are severe. That’s because there is a greater risk of serious side effects when this type of medicine is used in young children. The main danger from vomiting is dehydration. This means that your child may lose too much water and minerals. To prevent dehydration, you may be told to replace lost body fluids with oral rehydration solution. You can get this at pharmacies and most grocery stores without a prescription.  Home care  To treat vomiting and prevent dehydration in your child, follow the instructions from your child’s healthcare provider. This may include the following:  · For  infants, you may be told to feed your child for shorter intervals and more often. Do this as often directed by the provider. As vomiting lessens, your child may be able to resume his or her normal feeding schedule.  · For formula-fed infants, you may be told to give your child small amounts of rehydration solution every 15 minutes for 2 to 3 hours at first. How much solution to give varies by factors such as your child’s weight. Your provider will give exact instructions. As vomiting lessens, your child may be able to resume his or her normal feeding schedule.  · If your infant is already eating solid foods, it may be OK to gradually resume giving solid foods as vomiting lessens. Your child’s healthcare provider can tell  you more, if needed.  Follow-up care  Follow up with your child’s healthcare provider as advised. If testing was done, you will be told the results when they are ready. In some cases, more treatment may be needed.  When to seek medical advice  Unless your child’s healthcare provider advises otherwise, call the provider right away if your child:  · Has a fever (see Fever and children, below)  · Continues to vomit after the first 2 hours on fluids  · Has vomiting that lasts for more than 24 hours  · Has diarrhea more than 5 times a day or blood (red or black color) or mucus in his or her diarrhea  · Has blood in the vomit or stool  · Has a swollen belly or signs of belly pain  · Is vomiting forcefully (projectile vomiting)  · Has yellow or green-tinged vomit  · Is not passing stool  · Has dark urine or no urine for 8 hours, no tears when crying, sunken eyes, or dry mouth  · Won’t stop fussing or keeps crying and can’t be soothed  Call 911  Call 911 if your child:  · Has trouble breathing  · Is very confused  · Is very drowsy or has trouble waking up  · Faints  · Has an unusually fast heart rate  · Has large amounts of blood in the vomit or stool  · Has a seizure  · Has a stiff neck  Fever and children  Always use a digital thermometer to check your child’s temperature. Never use a mercury thermometer.  For infants and toddlers, be sure to use a rectal thermometer correctly. A rectal thermometer may accidentally poke a hole in (perforate) the rectum. It may also pass on germs from the stool. Always follow the product maker’s directions for proper use. If you don’t feel comfortable taking a rectal temperature, use another method. When you talk to your child’s healthcare provider, tell him or her which method you used to take your child’s temperature.  Here are guidelines for fever temperature. Ear temperatures aren’t accurate before 6 months of age. Don’t take an oral temperature until your child is at least 4 years  old.  Infant under 3 months old:  · Ask your child’s healthcare provider how you should take the temperature.  · Rectal or forehead (temporal artery) temperature of 100.4°F (38°C) or higher, or as directed by the provider  · Armpit temperature of 99°F (37.2°C) or higher, or as directed by the provider  Child age 3 to 36 months:  · Rectal, forehead (temporal artery), or ear temperature of 102°F (38.9°C) or higher, or as directed by the provider  · Armpit temperature of 101°F (38.3°C) or higher, or as directed by the provider  Child of any age:  · Repeated temperature of 104°F (40°C) or higher, or as directed by the provider  · Fever that lasts more than 24 hours in a child under 2 years old. Or a fever that lasts for 3 days in a child 2 years or older.   Date Last Reviewed: 3/1/2018  © 3404-8650 [employer]. [address] [place] [address], [place] [address]. All rights reserved. This information is not intended as a substitute for professional medical care. Always follow your healthcare professional's instructions.

## 2021-02-25 NOTE — HPI-TODAY'S VISIT:
The patient is seen in f/u from his visit in January.  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  He has this on a daily basis, only partially controlled with daily protonix and carafate.  He has no hematemesis, dysphagia, weight loss, or significant abdominal pain.  He is not having regular BMs on an every 2-3 day basis with use of metamucil almost every day.  He has no change in the bowel habits, abdominal pain, or significant rectal bleeding.

## 2021-03-01 PROBLEM — 82934008: Status: ACTIVE | Noted: 2020-10-20

## 2021-03-09 ENCOUNTER — OFFICE VISIT (OUTPATIENT)
Dept: URBAN - METROPOLITAN AREA CLINIC 118 | Facility: CLINIC | Age: 86
End: 2021-03-09

## 2021-04-19 ENCOUNTER — DASHBOARD ENCOUNTERS (OUTPATIENT)
Age: 86
End: 2021-04-19

## 2021-04-20 ENCOUNTER — OFFICE VISIT (OUTPATIENT)
Dept: URBAN - METROPOLITAN AREA CLINIC 118 | Facility: CLINIC | Age: 86
End: 2021-04-20

## 2021-04-20 RX ORDER — SIMVASTATIN 20 MG/1
TABLET, FILM COATED ORAL
Qty: 0 | Refills: 0 | COMMUNITY
Start: 1900-01-01

## 2021-04-20 RX ORDER — EZETIMIBE 10 MG/1
TAKE 1 TABLET (10 MG) BY ORAL ROUTE ONCE DAILY TABLET ORAL 1
Qty: 0 | Refills: 0 | COMMUNITY
Start: 1900-01-01

## 2021-04-20 RX ORDER — PANTOPRAZOLE SODIUM 40 MG/1
TAKE 1 TABLET (40 MG) BY ORAL ROUTE ONCE DAILY TABLET, DELAYED RELEASE ORAL 1
Qty: 90 | Refills: 3 | COMMUNITY
Start: 2020-05-20

## 2021-04-20 RX ORDER — DEXTROSE 4 G
TAKE 1 TABLET (10 MG) BY ORAL ROUTE ONCE DAILY TABLET,CHEWABLE ORAL 1
Qty: 0 | Refills: 0 | COMMUNITY
Start: 1900-01-01

## 2021-04-20 RX ORDER — HYDROCODONE BITARTRATE AND ACETAMINOPHEN 5; 325 MG/1; MG/1
TABLET ORAL
Qty: 0 | Refills: 0 | COMMUNITY
Start: 1900-01-01

## 2021-04-20 RX ORDER — FINASTERIDE 5 MG/1
TABLET, FILM COATED ORAL
Qty: 0 | Refills: 0 | COMMUNITY
Start: 1900-01-01

## 2021-04-20 RX ORDER — MONTELUKAST SODIUM 10 MG/1
1 TABLET TABLET ORAL ONCE A DAY
COMMUNITY

## 2021-04-20 RX ORDER — TEMAZEPAM 15 MG/1
CAPSULE ORAL
Qty: 0 | Refills: 0 | COMMUNITY
Start: 1900-01-01

## 2021-04-20 RX ORDER — LEVOTHYROXINE SODIUM 25 UG/1
TABLET ORAL
Qty: 0 | Refills: 0 | COMMUNITY
Start: 1900-01-01

## 2021-04-20 RX ORDER — METOPROLOL TARTRATE 50 MG/1
TABLET, FILM COATED ORAL
Qty: 0 | Refills: 0 | COMMUNITY
Start: 1900-01-01

## 2021-04-20 RX ORDER — TRAMADOL HYDROCHLORIDE 50 MG/1
1 TABLET AS NEEDED TABLET, FILM COATED ORAL ONCE A DAY
COMMUNITY

## 2021-04-20 RX ORDER — FAMOTIDINE 40 MG/1
TAKE 1 TABLET (40 MG) BY ORAL ROUTE ONCE DAILY AT BEDTIME TABLET, FILM COATED ORAL 1
Qty: 90 | Refills: 0 | COMMUNITY
Start: 2020-05-20

## 2021-04-20 RX ORDER — ASPIRIN 81 MG/1
TABLET, COATED ORAL
Qty: 0 | Refills: 0 | COMMUNITY
Start: 1900-01-01

## 2023-05-20 NOTE — PHYSICAL EXAM HENT:
Head, normocephalic, atraumatic, Face, Face within normal limits, Ears, External ears within normal limits, Nose/Nasopharynx, External nose  normal appearance, nares patent, no nasal discharge, Mouth and Throat, Oral cavity appearance normal, Breath odor normal, Lips, Appearance normal show